# Patient Record
(demographics unavailable — no encounter records)

---

## 2024-11-08 NOTE — HISTORY OF PRESENT ILLNESS
[FreeTextEntry8] : 51 year old female presents with 5 days of body aches, fatigue.  She denies SOB or fever.  Today she noted nasal congestion, mild cough

## 2025-04-09 NOTE — ASSESSMENT
[FreeTextEntry1] : CPE OF 52 Y OLD FEM = LABS AND EKG  LEFT RIB CAGE PAIN = NEURO EVAL  ABD BLOATING = GI EVAL ;HAD N-V 1 WEEK AGO FOR 1 DAY  [Vaccines Reviewed] : Immunizations reviewed today. Please see immunization details in the vaccine log within the immunization flowsheet.

## 2025-04-09 NOTE — HISTORY OF PRESENT ILLNESS
[de-identified] : CC OF PERSISTENT LEFT CW PAIN  ALSO CC OF ABD BLAOTING AND FLATULENCE ;SEEN BY GI 3 Y AGO ;HAD N/V X1 DAY 1 WEEK AGO  SEEN BY GYN AND ONCOLOGY ;HAD ABD AND PELVIC US AS WELL AS CT/MRI AS RECENT AS 1/25 INTENSITY OF PAIN IS MILD ,PERSISTENT IN NATURE AND FOR LAST 2 Y

## 2025-04-09 NOTE — PHYSICAL EXAM
[No Acute Distress] : no acute distress [Normal Sclera/Conjunctiva] : normal sclera/conjunctiva [Normal Outer Ear/Nose] : the outer ears and nose were normal in appearance [No JVD] : no jugular venous distention [Normal] : normal rate, regular rhythm, normal S1 and S2 and no murmur heard [No Edema] : there was no peripheral edema [Soft] : abdomen soft [Non Tender] : non-tender [Normal Bowel Sounds] : normal bowel sounds [Coordination Grossly Intact] : coordination grossly intact [No Focal Deficits] : no focal deficits [Alert and Oriented x3] : oriented to person, place, and time [de-identified] : LEFT MID BACK RIB CAGE TENDERNESS